# Patient Record
Sex: FEMALE | Employment: OTHER | URBAN - METROPOLITAN AREA
[De-identification: names, ages, dates, MRNs, and addresses within clinical notes are randomized per-mention and may not be internally consistent; named-entity substitution may affect disease eponyms.]

---

## 2020-10-15 ENCOUNTER — HOSPITAL ENCOUNTER (OUTPATIENT)
Dept: TELEMEDICINE | Facility: HOSPITAL | Age: 80
Discharge: HOME | End: 2020-10-15

## 2020-10-15 NOTE — CARE UPDATE
Tele stroke:  Spoke with ED attending regarding this patient with prior stroke who lives at the nursing home and has poor functional neurological status at baseline and has been having mental state changes, mumbling, and weak in the L side since 10/13/20. She is not a candidate for any intervention at this time, thus recommended admission and completing medical work up as well as MRI brain.  Consult cancelled.    .Rohit Tarango MD   Vascular Neurology  [facility]  [facility]